# Patient Record
(demographics unavailable — no encounter records)

---

## 2024-12-02 NOTE — BEGINNING OF VISIT
[0] : 2) Feeling down, depressed, or hopeless: Not at all (0) [[id number]] : 0 [Pain Scale: ___] : On a scale of 1-10, today the patient's pain is a(n) [unfilled]. [Never] : Never [Date Discussed (MM/DD/YY): ___] : Discussed: [unfilled] [With Patient/Caregiver] : with Patient/Caregiver

## 2024-12-02 NOTE — BEGINNING OF VISIT
[0] : 2) Feeling down, depressed, or hopeless: Not at all (0) [[id number]] : 0 [Pain Scale: ___] : On a scale of 1-10, today the patient's pain is a(n) [unfilled]. [Never] : Never [Date Discussed (MM/DD/YY): ___] : Discussed: [unfilled] [With Patient/Caregiver] : with Patient/Caregiver

## 2024-12-02 NOTE — BEGINNING OF VISIT
[0] : 2) Feeling down, depressed, or hopeless: Not at all (0) [[id number]] : 0 [Pain Scale: ___] : On a scale of 1-10, today the patient's pain is a(n) [unfilled]. [Never] : Never [Date Discussed (MM/DD/YY): ___] : Discussed: [unfilled] [With Patient/Caregiver] : with Patient/Caregiver

## 2024-12-03 NOTE — ASSESSMENT
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  Discussion: For over 70 minutes, in the presence of Karla Solorzano and Mindy Gardner, we discussed the biology of cancer of white blood cells. 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  We discussed as his abnormal, absolute lymphocyte count is less than 5,000 that he is better classified as a monoclonal B-cell lymphocytosis rather than chronic lymphocytic leukemia.  He is aware that early intervention for this illness is not associated with a survival benefit follow-up with this office is arranged.  We also discussed his high blood pressure today. He reports that he takes his blood pressure frequently at home and it is well-controlled --  that today's visit reflects whitecoat syndrome.

## 2024-12-03 NOTE — HISTORY OF PRESENT ILLNESS
[de-identified] : 6 Nikki is referred by Dr. Enid Eddy for CLL. Presents with his wife: Reddy [de-identified] : Patient identification: Saji is a 79-year-old retired  [place] [employer].  He does not abuse ETOH or tobacco. His past medical history of glaucoma, hypertension, HLD, hearing loss and osteoporosis (now resolved)  Two daughters 45 and 48: alive and well.  Medications:  xalatan dorzolamide eszopiclone amlodipine 5mg  fenofibrate vardenafil 10mg Fish Oil  NKDA  He presented on September 13, 2024 with an absolute lymphocyte count of 5,107, hemoglobin 13.5, platelet count 192,000. Flow cytometry 10/30/2024 clonal polyp relation of kappa restricted B cells phenotype CLL: 20% of 10.3 total WBC IgA 140, IgG 1054, IgM 70 mutated (5.1% sequence: IGVH: V3-74 Del 13q ABD MRI: 11/27/24: hemangiomas; no adenopathy.   Saji does not spontaneously report: fever, chills, sweats, weight loss, skin rashes, petechiae, bruising, eye irritation, mouth sores, sore throat, cough, hemoptysis, pleuritic chest pain, dyspnea, change in vision, focal numbness/weakness, chest pains, palpitations, nausea, vomiting, diarrhea, constipation, dysuria, hematuria, bowel or bladder incontinence, sever joint pain, back pain or gait disturbance.  Examination: Saji is articulate and in no acute distress No occiput, poster cervical, anterior cervical, submandibular, sublingual, submental, supraclavicular nor axillary adenopathy No spinal, paraspinal nor CVA tenderness Lungs: Clear Cardiac: without rubs Abd: soft and non-tender, Traube's space is tympanitic No inguinal nor femoral adenopathy No sig peripheral edema Gait: unencumbered

## 2024-12-03 NOTE — HISTORY OF PRESENT ILLNESS
[de-identified] : 6 Nikki is referred by Dr. Enid Eddy for CLL. Presents with his wife: Reddy [de-identified] : Patient identification: Saji is a 79-year-old retired  [place] [employer].  He does not abuse ETOH or tobacco. His past medical history of glaucoma, hypertension, HLD, hearing loss and osteoporosis (now resolved)  Two daughters 45 and 48: alive and well.  Medications:  xalatan dorzolamide eszopiclone amlodipine 5mg  fenofibrate vardenafil 10mg Fish Oil  NKDA  He presented on September 13, 2024 with an absolute lymphocyte count of 5,107, hemoglobin 13.5, platelet count 192,000. Flow cytometry 10/30/2024 clonal polyp relation of kappa restricted B cells phenotype CLL: 20% of 10.3 total WBC IgA 140, IgG 1054, IgM 70 mutated (5.1% sequence: IGVH: V3-74 Del 13q ABD MRI: 11/27/24: hemangiomas; no adenopathy.   Saji does not spontaneously report: fever, chills, sweats, weight loss, skin rashes, petechiae, bruising, eye irritation, mouth sores, sore throat, cough, hemoptysis, pleuritic chest pain, dyspnea, change in vision, focal numbness/weakness, chest pains, palpitations, nausea, vomiting, diarrhea, constipation, dysuria, hematuria, bowel or bladder incontinence, sever joint pain, back pain or gait disturbance.  Examination: Saji is articulate and in no acute distress No occiput, poster cervical, anterior cervical, submandibular, sublingual, submental, supraclavicular nor axillary adenopathy No spinal, paraspinal nor CVA tenderness Lungs: Clear Cardiac: without rubs Abd: soft and non-tender, Traube's space is tympanitic No inguinal nor femoral adenopathy No sig peripheral edema Gait: unencumbered

## 2024-12-03 NOTE — HISTORY OF PRESENT ILLNESS
[de-identified] : 6 Nikki is referred by Dr. Enid Eddy for CLL. Presents with his wife: Reddy [de-identified] : Patient identification: Saji is a 79-year-old retired  [place] [employer].  He does not abuse ETOH or tobacco. His past medical history of glaucoma, hypertension, HLD, hearing loss and osteoporosis (now resolved)  Two daughters 45 and 48: alive and well.  Medications:  xalatan dorzolamide eszopiclone amlodipine 5mg  fenofibrate vardenafil 10mg Fish Oil  NKDA  He presented on September 13, 2024 with an absolute lymphocyte count of 5,107, hemoglobin 13.5, platelet count 192,000. Flow cytometry 10/30/2024 clonal polyp relation of kappa restricted B cells phenotype CLL: 20% of 10.3 total WBC IgA 140, IgG 1054, IgM 70 mutated (5.1% sequence: IGVH: V3-74 Del 13q ABD MRI: 11/27/24: hemangiomas; no adenopathy.   Sjai does not spontaneously report: fever, chills, sweats, weight loss, skin rashes, petechiae, bruising, eye irritation, mouth sores, sore throat, cough, hemoptysis, pleuritic chest pain, dyspnea, change in vision, focal numbness/weakness, chest pains, palpitations, nausea, vomiting, diarrhea, constipation, dysuria, hematuria, bowel or bladder incontinence, sever joint pain, back pain or gait disturbance.  Examination: Saji is articulate and in no acute distress No occiput, poster cervical, anterior cervical, submandibular, sublingual, submental, supraclavicular nor axillary adenopathy No spinal, paraspinal nor CVA tenderness Lungs: Clear Cardiac: without rubs Abd: soft and non-tender, Traube's space is tympanitic No inguinal nor femoral adenopathy No sig peripheral edema Gait: unencumbered

## 2025-06-02 NOTE — HISTORY OF PRESENT ILLNESS
[de-identified] : Saji was last seen: 12/2/246 Presents with his wife: Reddy [de-identified] : Reason for visit: CLL  Since last visit:  Medications:  xalatan dorzolamide eszopiclone amlodipine 5mg  fenofibrate vardenafil 10mg Fish Oil  ANSHUL Lennon does not spontaneously report: fever, chills, sweats, weight loss, skin rashes, petechiae, bruising, eye irritation, mouth sores, sore throat, cough, hemoptysis, pleuritic chest pain, dyspnea, change in vision, focal numbness/weakness, chest pains, palpitations, nausea, vomiting, diarrhea, constipation, dysuria, hematuria, bowel or bladder incontinence, sever joint pain, back pain or gait disturbance.  Examination: Saji is articulate and in no acute distress No occiput, poster cervical, anterior cervical, submandibular, sublingual, submental, supraclavicular nor axillary adenopathy No spinal, paraspinal nor CVA tenderness Lungs: Clear Cardiac: without rubs Abd: soft and non-tender, Traube's space is tympanitic No inguinal nor femoral adenopathy No sig peripheral edema Gait: unencumbered   CBC

## 2025-06-02 NOTE — HISTORY OF PRESENT ILLNESS
[de-identified] : Saji was last seen: 12/2/246 Presents with his wife: Reddy [de-identified] : Reason for visit: CLL  Since last visit:  Medications:  xalatan dorzolamide eszopiclone amlodipine 5mg  fenofibrate vardenafil 10mg Fish Oil  ANSHUL Lennon does not spontaneously report: fever, chills, sweats, weight loss, skin rashes, petechiae, bruising, eye irritation, mouth sores, sore throat, cough, hemoptysis, pleuritic chest pain, dyspnea, change in vision, focal numbness/weakness, chest pains, palpitations, nausea, vomiting, diarrhea, constipation, dysuria, hematuria, bowel or bladder incontinence, sever joint pain, back pain or gait disturbance.  Examination: Saji is articulate and in no acute distress No occiput, poster cervical, anterior cervical, submandibular, sublingual, submental, supraclavicular nor axillary adenopathy No spinal, paraspinal nor CVA tenderness Lungs: Clear Cardiac: without rubs Abd: soft and non-tender, Traube's space is tympanitic No inguinal nor femoral adenopathy No sig peripheral edema Gait: unencumbered   CBC